# Patient Record
Sex: FEMALE | Race: BLACK OR AFRICAN AMERICAN
[De-identification: names, ages, dates, MRNs, and addresses within clinical notes are randomized per-mention and may not be internally consistent; named-entity substitution may affect disease eponyms.]

---

## 2021-10-14 ENCOUNTER — HOSPITAL ENCOUNTER (EMERGENCY)
Dept: HOSPITAL 61 - ER | Age: 51
LOS: 1 days | Discharge: HOME | End: 2021-10-15
Payer: COMMERCIAL

## 2021-10-14 VITALS — WEIGHT: 220.46 LBS | BODY MASS INDEX: 39.06 KG/M2 | HEIGHT: 63 IN

## 2021-10-14 DIAGNOSIS — Z88.8: ICD-10-CM

## 2021-10-14 DIAGNOSIS — M19.072: Primary | ICD-10-CM

## 2021-10-14 DIAGNOSIS — Z88.0: ICD-10-CM

## 2021-10-14 PROCEDURE — 76881 US COMPL JOINT R-T W/IMG: CPT

## 2021-10-14 PROCEDURE — 73630 X-RAY EXAM OF FOOT: CPT

## 2021-10-14 NOTE — ED.ADGEN
Past Medical History


Past Surgical History:  Hysterectomy


Additional Past Surgical Histo:  LEFT CHEST PORT FROM RIGHT SIDED BREAST CANCER





General Adult


EDM:


Chief Complaint:  FOOT INJURY PAIN





HPI:


HPI:





Patient is a 51  year old painful soft bump on the dorsum of foot.  Patient 

states that she woke up and walk around started having increasing soreness on 

the top of her foot.  Denies any injury.  Says it feels like a throbbing now.  

Has been icing with some relief.  Denies any trauma, redness.  This is above is 

painful.  Denies wearing any tight or uncomfortable shoes.





Review of Systems:


Review of Systems:


All other systems within normal limits except for as noted in the HPI





Allergies:


Allergies:





Allergies








Coded Allergies Type Severity Reaction Last Updated Verified


 


  Penicillins Allergy Severe  10/14/21 Yes


 


  nickel Allergy Intermediate  10/14/21 Yes











Physical Exam:


PE:


Constitutional: Well developed, well nourished, no acute distress, non-toxic 

appearance. []


HENT: Normocephalic, atraumatic, bilateral external ears normal,  nose normal. 

[]


Eyes: PERRLA, conjunctiva normal, no discharge. [] 


Neck: No rigidity, supple, no stridor. [] 


Cardiovascular: Regular rate and rhythm, brisk cap refill []


Lungs & Thorax: Non labored symmetric respirations, no tachypnea or respiratory 

distress []


Abdomen: Soft, nondistended.


Skin: Warm, dry, no erythema, no rash. [] 


Back: Unremarkable


Extremities: No deformities, range of motion grossly intact, no lower extremity 

edema.  Soft about 2 to 3 cm circular area on dorsum of left foot that is tender

 to the touch.  [] 


Neurologic: Alert and oriented X 3, no focal deficits noted. []


Psychologic: Affect normal, judgement normal, mood normal. []





Current Patient Data:


Vital Signs:





                                   Vital Signs








  Date Time  Temp Pulse Resp B/P (MAP) Pulse Ox O2 Delivery O2 Flow Rate FiO2


 


10/15/21 00:49  68 20 135/74 (94) 100 Room Air  


 


10/14/21 23:20 97.9       





 97.9       











EKG:


EKG:


[]





Heart Score:


C/O Chest Pain:  No


Risk Factors:


Risk Factors:  DM, Current or recent (<one month) smoker, HTN, HLP, family 

history of CAD, obesity.


Risk Scores:


Score 0 - 3:  2.5% MACE over next 6 weeks - Discharge Home


Score 4 - 6:  20.3% MACE over next 6 weeks - Admit for Clinical Observation


Score 7 - 10:  72.7% MACE over next 6 weeks - Early Invasive Strategies





Radiology/Procedures:


Radiology/Procedures:


[place] [address]


                                 [phone]


                                        


                                 IMAGING REPORT





                                     Signed





PATIENT: LIZ HUGHES ACCOUNT: [id number]     MRN#: [id number]


: 1970           LOCATION: ER              AGE: 51


SEX: F                    EXAM DT: 10/14/21         ACCESSION#: [id number]


STATUS: REG ER            ORD. PHYSICIAN: [place] FRIEDMAN MD


REASON: left foot cyst


PROCEDURE: EXT NON VASC LEFT





US EXT NON VASC LEFT





History:Reason: left foot cyst / Spl. Instructions:  / History: 





Comparison: None





Technique: Sonographic examination of the left foot





Findings:


Small heterogeneous collection along the dorsal aspect of the left foot measures

 2.0 x 0.6 cm. No Doppler flow within the lesion.





Impression:


1.  Small heterogeneous collection along the dorsal aspect of the left foot, may

 relate to tenosynovitis or complicated ganglion cyst. MRI with and without 

contrast can further assess on a nonemergent basis. 





Electronically signed by: Barbara Howe DO (10/15/2021 12:50 AM) [facility]














DICTATED and SIGNED BY:     BARBARA HOWE DO


DATE:     10/15/21 [id number] 0


[][address] [address]


                                 [phone]


                                        


                                 IMAGING REPORT





                                     Signed





PATIENT: LIZ HUGHES ACCOUNT: [id number]     MRN#: [id number]


: 1970           LOCATION: ER              AGE: 51


SEX: F                    EXAM DT: 10/14/21         ACCESSION#: [id number]


STATUS: REG ER            ORD. PHYSICIAN: [place] FRIEDMAN MD


REASON: painful nontraumatic bump on dorsum of foot


PROCEDURE: FOOT LEFT 3V





XR FOOT_LEFT 3 VIEWS





History: Reason: painful nontraumatic bump on dorsum of foot / Spl. 

Instructions:  / History: 





Technique: 3 views left foot.





Comparison: None.





Findings:


No dislocation. No acute fracture. Mild first MTP DJD. Plantar calcaneal spur. 

No pathologic osseous lesion.





Impression: 


1.  No acute osseous abnormality.





Electronically signed by: Barbara Howe DO (10/15/2021 12:43 AM) [facility]














DICTATED and SIGNED BY:     BARBARA HOWE DO


DATE:     10/15/21 [id number] 0





Course & Med Decision Making:


Course & Med Decision Making


Pertinent Labs and Imaging studies reviewed. (See chart for details)





[]





Dragon Disclaimer:


Dragon Disclaimer:


This electronic medical record was generated, in whole or in part, using a voice

 recognition dictation system.





Departure


Departure


Impression:  


   Primary Impression:  


   Inflammation of foot joint


Disposition:   HOME / SELF CARE / HOMELESS


Condition:  STABLE


Referrals:  


UNKNOWN PCP NAME (PCP)


Patient Instructions:  RICE - Routine Care for Injuries











[place] FRIEDMAN MD            Oct 14, 2021 23:41

## 2021-10-15 VITALS — DIASTOLIC BLOOD PRESSURE: 74 MMHG | SYSTOLIC BLOOD PRESSURE: 128 MMHG

## 2021-10-15 NOTE — RAD
US EXT NON VASC LEFT



History:Reason: left foot cyst / Spl. Instructions:  / History: 



Comparison: None



Technique: Sonographic examination of the left foot



Findings:

Small heterogeneous collection along the dorsal aspect of the left foot measures 2.0 x 0.6 cm. No Dop
pler flow within the lesion.



Impression:

1.  Small heterogeneous collection along the dorsal aspect of the left foot, may relate to tenosynovi
tis or complicated ganglion cyst. MRI with and without contrast can further assess on a nonemergent b
asis. 



Electronically signed by: Gerardo Owen DO (10/15/2021 12:50 AM) [facility]

## 2021-10-15 NOTE — RAD
Jean know about this statement.     Judie Kim RN on 2/6/2020 at 12:56 PM     XR FOOT_LEFT 3 VIEWS



History: Reason: painful nontraumatic bump on dorsum of foot / Spl. Instructions:  / History: 



Technique: 3 views left foot.



Comparison: None.



Findings:

No dislocation. No acute fracture. Mild first MTP DJD. Plantar calcaneal spur. No pathologic osseous 
lesion.



Impression: 

1.  No acute osseous abnormality.



Electronically signed by: Gerardo Owen DO (10/15/2021 12:43 AM) [facility]